# Patient Record
Sex: FEMALE | Race: WHITE
[De-identification: names, ages, dates, MRNs, and addresses within clinical notes are randomized per-mention and may not be internally consistent; named-entity substitution may affect disease eponyms.]

---

## 2023-03-29 ENCOUNTER — HOSPITAL ENCOUNTER (INPATIENT)
Dept: HOSPITAL 74 - YASAS | Age: 53
LOS: 3 days | Discharge: HOME | DRG: 773 | End: 2023-04-01
Attending: SURGERY | Admitting: ALLERGY & IMMUNOLOGY
Payer: COMMERCIAL

## 2023-03-29 VITALS — BODY MASS INDEX: 15.7 KG/M2

## 2023-03-29 DIAGNOSIS — Z86.59: ICD-10-CM

## 2023-03-29 DIAGNOSIS — Z91.410: ICD-10-CM

## 2023-03-29 DIAGNOSIS — F14.20: ICD-10-CM

## 2023-03-29 DIAGNOSIS — Z88.8: ICD-10-CM

## 2023-03-29 DIAGNOSIS — F17.210: ICD-10-CM

## 2023-03-29 DIAGNOSIS — Z62.810: ICD-10-CM

## 2023-03-29 DIAGNOSIS — B20: ICD-10-CM

## 2023-03-29 DIAGNOSIS — F11.23: Primary | ICD-10-CM

## 2023-03-29 DIAGNOSIS — F10.230: ICD-10-CM

## 2023-03-29 DIAGNOSIS — F19.282: ICD-10-CM

## 2023-03-29 PROCEDURE — U0005 INFEC AGEN DETEC AMPLI PROBE: HCPCS

## 2023-03-29 PROCEDURE — HZ2ZZZZ DETOXIFICATION SERVICES FOR SUBSTANCE ABUSE TREATMENT: ICD-10-PCS | Performed by: SURGERY

## 2023-03-29 PROCEDURE — G0378 HOSPITAL OBSERVATION PER HR: HCPCS

## 2023-03-29 PROCEDURE — U0003 INFECTIOUS AGENT DETECTION BY NUCLEIC ACID (DNA OR RNA); SEVERE ACUTE RESPIRATORY SYNDROME CORONAVIRUS 2 (SARS-COV-2) (CORONAVIRUS DISEASE [COVID-19]), AMPLIFIED PROBE TECHNIQUE, MAKING USE OF HIGH THROUGHPUT TECHNOLOGIES AS DESCRIBED BY CMS-2020-01-R: HCPCS

## 2023-03-29 RX ADMIN — Medication SCH MG: at 23:11

## 2023-03-29 RX ADMIN — Medication SCH: at 23:14

## 2023-03-29 RX ADMIN — MONTELUKAST SODIUM SCH MG: 10 TABLET, COATED ORAL at 23:10

## 2023-03-29 RX ADMIN — IPRATROPIUM BROMIDE AND ALBUTEROL SULFATE SCH AMP: .5; 3 SOLUTION RESPIRATORY (INHALATION) at 23:13

## 2023-03-29 RX ADMIN — METHOCARBAMOL PRN MG: 500 TABLET ORAL at 23:12

## 2023-03-30 RX ADMIN — NICOTINE PRN MG: 4 INHALANT RESPIRATORY (INHALATION) at 22:25

## 2023-03-30 RX ADMIN — IBUPROFEN PRN MG: 600 TABLET, FILM COATED ORAL at 17:00

## 2023-03-30 RX ADMIN — LIDOCAINE SCH PATCH: 50 PATCH TOPICAL at 10:16

## 2023-03-30 RX ADMIN — Medication SCH EACH: at 22:47

## 2023-03-30 RX ADMIN — ACETAMINOPHEN PRN MG: 325 TABLET ORAL at 22:26

## 2023-03-30 RX ADMIN — METHOCARBAMOL PRN MG: 500 TABLET ORAL at 10:15

## 2023-03-30 RX ADMIN — NICOTINE SCH: 21 PATCH TRANSDERMAL at 10:23

## 2023-03-30 RX ADMIN — IPRATROPIUM BROMIDE AND ALBUTEROL SULFATE SCH AMP: .5; 3 SOLUTION RESPIRATORY (INHALATION) at 06:53

## 2023-03-30 RX ADMIN — Medication SCH TAB: at 10:16

## 2023-03-30 RX ADMIN — Medication SCH MG: at 22:25

## 2023-03-30 RX ADMIN — MONTELUKAST SODIUM SCH MG: 10 TABLET, COATED ORAL at 22:25

## 2023-03-30 RX ADMIN — MIRTAZAPINE SCH MG: 15 TABLET, FILM COATED ORAL at 22:24

## 2023-03-30 RX ADMIN — IPRATROPIUM BROMIDE AND ALBUTEROL SULFATE SCH: .5; 3 SOLUTION RESPIRATORY (INHALATION) at 10:14

## 2023-03-30 RX ADMIN — IPRATROPIUM BROMIDE AND ALBUTEROL SULFATE SCH AMP: .5; 3 SOLUTION RESPIRATORY (INHALATION) at 20:54

## 2023-03-30 RX ADMIN — METHOCARBAMOL PRN MG: 500 TABLET ORAL at 17:01

## 2023-03-31 RX ADMIN — NICOTINE PRN MG: 4 INHALANT RESPIRATORY (INHALATION) at 17:00

## 2023-03-31 RX ADMIN — MONTELUKAST SODIUM SCH MG: 10 TABLET, COATED ORAL at 22:00

## 2023-03-31 RX ADMIN — IPRATROPIUM BROMIDE AND ALBUTEROL SULFATE SCH: .5; 3 SOLUTION RESPIRATORY (INHALATION) at 16:17

## 2023-03-31 RX ADMIN — LIDOCAINE SCH PATCH: 50 PATCH TOPICAL at 10:18

## 2023-03-31 RX ADMIN — IPRATROPIUM BROMIDE AND ALBUTEROL SULFATE SCH: .5; 3 SOLUTION RESPIRATORY (INHALATION) at 08:04

## 2023-03-31 RX ADMIN — METHOCARBAMOL PRN MG: 500 TABLET ORAL at 10:17

## 2023-03-31 RX ADMIN — Medication SCH MG: at 22:00

## 2023-03-31 RX ADMIN — ACETAMINOPHEN PRN MG: 325 TABLET ORAL at 21:07

## 2023-03-31 RX ADMIN — IPRATROPIUM BROMIDE AND ALBUTEROL SULFATE SCH: .5; 3 SOLUTION RESPIRATORY (INHALATION) at 10:18

## 2023-03-31 RX ADMIN — METHOCARBAMOL PRN MG: 500 TABLET ORAL at 22:01

## 2023-03-31 RX ADMIN — Medication SCH TAB: at 10:19

## 2023-03-31 RX ADMIN — NICOTINE SCH: 21 PATCH TRANSDERMAL at 10:18

## 2023-03-31 RX ADMIN — IBUPROFEN PRN MG: 600 TABLET, FILM COATED ORAL at 17:43

## 2023-03-31 RX ADMIN — MIRTAZAPINE SCH MG: 15 TABLET, FILM COATED ORAL at 22:00

## 2023-03-31 RX ADMIN — Medication SCH: at 22:01

## 2023-04-01 VITALS — DIASTOLIC BLOOD PRESSURE: 67 MMHG | HEART RATE: 58 BPM | TEMPERATURE: 96.8 F | SYSTOLIC BLOOD PRESSURE: 129 MMHG

## 2023-04-01 VITALS — RESPIRATION RATE: 18 BRPM

## 2023-07-20 ENCOUNTER — EMERGENCY (EMERGENCY)
Facility: HOSPITAL | Age: 53
LOS: 1 days | Discharge: HOME | End: 2023-07-20
Attending: EMERGENCY MEDICINE | Admitting: EMERGENCY MEDICINE
Payer: COMMERCIAL

## 2023-07-20 VITALS
OXYGEN SATURATION: 97 % | DIASTOLIC BLOOD PRESSURE: 62 MMHG | RESPIRATION RATE: 10 BRPM | TEMPERATURE: 98 F | SYSTOLIC BLOOD PRESSURE: 81 MMHG | HEIGHT: 59 IN | WEIGHT: 89.95 LBS | HEART RATE: 112 BPM

## 2023-07-20 VITALS
RESPIRATION RATE: 16 BRPM | HEART RATE: 85 BPM | OXYGEN SATURATION: 98 % | SYSTOLIC BLOOD PRESSURE: 102 MMHG | DIASTOLIC BLOOD PRESSURE: 58 MMHG | TEMPERATURE: 98 F

## 2023-07-20 PROCEDURE — 99285 EMERGENCY DEPT VISIT HI MDM: CPT | Mod: 25

## 2023-07-20 PROCEDURE — 71045 X-RAY EXAM CHEST 1 VIEW: CPT | Mod: 26

## 2023-07-20 PROCEDURE — 71045 X-RAY EXAM CHEST 1 VIEW: CPT

## 2023-07-20 PROCEDURE — 99284 EMERGENCY DEPT VISIT MOD MDM: CPT

## 2023-07-20 RX ORDER — ONDANSETRON 8 MG/1
4 TABLET, FILM COATED ORAL ONCE
Refills: 0 | Status: DISCONTINUED | OUTPATIENT
Start: 2023-07-20 | End: 2023-07-20

## 2023-07-20 RX ORDER — ONDANSETRON 8 MG/1
4 TABLET, FILM COATED ORAL ONCE
Refills: 0 | Status: COMPLETED | OUTPATIENT
Start: 2023-07-20 | End: 2023-07-20

## 2023-07-20 RX ORDER — SODIUM CHLORIDE 9 MG/ML
1000 INJECTION INTRAMUSCULAR; INTRAVENOUS; SUBCUTANEOUS ONCE
Refills: 0 | Status: DISCONTINUED | OUTPATIENT
Start: 2023-07-20 | End: 2023-07-20

## 2023-07-20 RX ADMIN — ONDANSETRON 4 MILLIGRAM(S): 8 TABLET, FILM COATED ORAL at 19:51

## 2023-07-20 NOTE — ED ADULT NURSE NOTE - OBJECTIVE STATEMENT
Pt presenting from detox center. Admits to "half a bag of heroin which is much less than I normally do" before arrival. Pt states she feels nauseous. Denies complaints. States she "always has low blood pressure". Denies ETOH. States she feels fine now. Speaking full complete sentences eating food in NAD.

## 2023-07-20 NOTE — ED PROVIDER NOTE - PATIENT PORTAL LINK FT
You can access the FollowMyHealth Patient Portal offered by [facility] by registering at the following website: [email or web address]. By joining [employer]’s FollowMyHealth portal, you will also be able to view your health information using other applications (apps) compatible with our system.

## 2023-07-20 NOTE — ED ADULT TRIAGE NOTE - CHIEF COMPLAINT QUOTE
pt bibems from detox center. pt endorses using IV heroin x2 hours ago, EMS called due to Pox 85% and AMS. Pt awake intermittently, easily arouseable.  Pt hypotensive 81/46, Pox mid 80's%. No narcan given by EMS.

## 2023-07-20 NOTE — ED PROVIDER NOTE - CONSTITUTIONAL, MLM
normal... thin appearing, awake, alert, but falls asleep when not stimulated, oriented to person, place, and in no apparent distress.

## 2023-07-20 NOTE — ED PROVIDER NOTE - PROGRESS NOTE
[facility] Assisted primary nurse to aid pt comfort. Arrived at [facility] with client agitated moving all extremities, grabbing at caregiver, pinching caregivers and in obvious distress with very rapid HR. Attempted to reach physician at [phone]. AOC also advised of our difficulties at [facility] 4037. Client after several sq doses and IV established is now calm with relaxed extremities and respirations. HR is irregular but now down to 80.   Will continue to assist as needed Improved.

## 2023-07-20 NOTE — ED PROVIDER NOTE - CLINICAL SUMMARY MEDICAL DECISION MAKING FREE TEXT BOX
ams post opiate overdose. reportedly w nausea/vomiting x a few days. initial plan for ivf, labs, eval electrolyte abnormality, dehydration, infection. cxr r/o pneumonia/aspiration (cough). rn unable to get iv/blood, pt refusing further attempts. given po zofran/ hydration. observed for sobriety. ams post opiate overdose. reportedly w nausea/vomiting x a few days. initial plan for ivf, labs, eval electrolyte abnormality, dehydration, infection. cxr r/o pneumonia/aspiration (cough). rn unable to get iv/blood, pt refusing further attempts. given po zofran/ hydration. observed for sobriety.  clinically improved. tolerating po. detox referral given. narcan kit given

## 2023-07-20 NOTE — ED PROVIDER NOTE - NSFOLLOWUPINSTRUCTIONS_ED_ALL_ED_FT
Please see your primary care provider for followup.  Call for appointment.  If you have any problems with followup, please call the ED Referral Coordinator at [phone].  Return to the ER if symptoms worsen or other concerns.    Substance Use Disorder  Substance use disorder occurs when a person's repeated use of drugs or alcohol interferes with his or her ability to be productive. This disorder can cause problems with mental and physical health. It can affect your ability to have healthy relationships, and it can keep you from being able to meet your responsibilities at work, home, or school. It can also lead to addiction, which is a condition in which the person cannot stop using the substance consistently for a period of time.    The most commonly abused substances include:  Alcohol.  Tobacco.  Marijuana.  Stimulants, such as cocaine and methamphetamine.  Hallucinogens, such as LSD and PCP.  Opioids, such as some prescription pain medicines and heroin.  What are the causes?  This condition may develop due to many complex social, psychological, or physical reasons, such as:  Stress.  Abuse.  Peer pressure.  Anxiety or depression.  What increases the risk?  This condition is more likely to develop in people who:  Use substances to cope with stress.  Have been abused.  Have a mental health disorder, such as depression.  Have a family history of substance use disorder.  What are the signs or symptoms?  Symptoms of this condition include:  Using the substance for longer periods of time or at a higher dosage than what is normal or intended.  Having a lasting desire to use the substance.  Being unable to slow down or stop your use of the substance.  Spending an abnormal amount of time getting the substance, using the substance, or recovering from using the substance.  Craving the substance.  Using the substance in a way that interferes with work, school, social activities, and personal relationships.  Using the substance even after having negative consequences, such as:  Health problems.  Legal or financial troubles.  Job loss.  Broken relationships.  Needing more and more of the substance to get the same effect (developing tolerance).  Experiencing unpleasant symptoms if you do not use the substance (withdrawal).  Using the substance to avoid withdrawal.  How is this diagnosed?  This condition may be diagnosed based on:  A physical exam.  Your history of substance use.  Your symptoms. This includes:  How substance use affects your life.  Changes in personality, behaviors, and mood.  Having at least two symptoms of substance use disorder within a 12-month period.  Health issues related to substance use, such as liver damage, shortness of breath, fatigue, cough, or heart problems.  Blood or urine tests to screen for alcohol and drugs.  How is this treated?  ImageThis condition may be treated by:  Stopping substance use safely. This may require taking medicines and being closely observed for several days.  Taking part in group and individual counseling from mental health providers who help people with substance use disorder.  Staying at a live-in (residential) treatment center for several days or weeks.  Attending daily counseling sessions at a treatment center.  Taking medicine as told by your health care provider:  To ease symptoms and prevent complications during withdrawal.  To treat other mental health issues, such as depression or anxiety.  To block cravings by causing the same effects as the substance.  To block the effects of the substance or replace good sensations with unpleasant ones.  Going to a support group to share your experience with others who are going through the same thing.  Recovery can be a long process. Many people who undergo treatment start using the substance again after stopping (relapse). If you relapse, that does not mean that treatment will not work.    Follow these instructions at home:    Take over-the-counter and prescription medicines only as told by your health care provider.  Do not use any drugs or alcohol.  Attend support groups as needed. These groups, including 12-step programs like Alcoholics Anonymous and Narcotics Anonymous, are an important part of long-term recovery for many people.  Keep all follow-up visits as told by your health care providers. This is important. This includes continuing to work with therapists and support groups.  Contact a health care provider if:  You cannot take your medicines as told.  Your symptoms get worse.  You have trouble resisting the urge to use drugs or alcohol.  Get help right away if you:  Relapse.  Think that you may have taken too much of a drug. The hotline of the National Poison Control Center is (743) 628-9955.  Have signs of an overdose. Symptoms include:  Chest pain.  Confusion.  Sleepiness or difficulty staying awake.  Slowed breathing.  Nausea or vomiting.  A seizure.  Have serious thoughts about hurting yourself or someone else.  Drug overdose is an emergency. Do not wait to see if the symptoms will go away. Get medical help right away. Call your local emergency services (481 in the [place] Do not drive yourself to the hospital.     If you ever feel like you may hurt yourself or others, or have thoughts about taking your own life, get help right away. You can go to your nearest emergency department or call:   Your local emergency services (911 in the [place]   A suicide crisis helpline, such as the National Suicide Prevention Lifeline at 1-976.599.4440. This is open 24 hours a day.   Summary  Substance use disorder occurs when a person's repeated use of drugs or alcohol interferes with his or her ability to be productive. It can affect your ability to have healthy relationships, keep you from being able to meet your responsibilities at work, home, or school, and lead to addiction.  Taking part in group and individual counseling from mental health providers is one possible treatment for people with substance use disorder.  Recovery can be a long process. Many people who undergo treatment start using the substance again after stopping (relapse). A relapse does not mean that treatment will not work.  Attend support groups as needed, such as Alcoholics Anonymous and Narcotics Anonymous. These groups are an important part of long-term recovery for many people.  This information is not intended to replace advice given to you by your health care provider. Make sure you discuss any questions you have with your health care provider.

## 2023-07-20 NOTE — ED PROVIDER NOTE - OBJECTIVE STATEMENT
hiv+, history of polysubstance abuse (crack/iv heroin), pt bibems from detox center with ams. Pt denies using drugs today but reportedly told triage nurse she used IV heroin x2 hours ago. No narcan given by EMS. Pt says she has been going through bad withdrawal symptoms past 3 days with vomiting and diarrhea.

## 2023-07-23 DIAGNOSIS — F17.200 NICOTINE DEPENDENCE, UNSPECIFIED, UNCOMPLICATED: ICD-10-CM

## 2023-07-23 DIAGNOSIS — Z88.8 ALLERGY STATUS TO OTHER DRUGS, MEDICAMENTS AND BIOLOGICAL SUBSTANCES: ICD-10-CM

## 2023-07-23 DIAGNOSIS — R19.7 DIARRHEA, UNSPECIFIED: ICD-10-CM

## 2023-07-23 DIAGNOSIS — T40.601A POISONING BY UNSPECIFIED NARCOTICS, ACCIDENTAL (UNINTENTIONAL), INITIAL ENCOUNTER: ICD-10-CM

## 2023-07-23 DIAGNOSIS — R41.82 ALTERED MENTAL STATUS, UNSPECIFIED: ICD-10-CM

## 2023-07-23 DIAGNOSIS — R11.10 VOMITING, UNSPECIFIED: ICD-10-CM

## 2023-07-23 DIAGNOSIS — B20 HUMAN IMMUNODEFICIENCY VIRUS [HIV] DISEASE: ICD-10-CM

## 2023-07-23 DIAGNOSIS — Z86.59 PERSONAL HISTORY OF OTHER MENTAL AND BEHAVIORAL DISORDERS: ICD-10-CM
